# Patient Record
Sex: FEMALE | Race: BLACK OR AFRICAN AMERICAN | Employment: FULL TIME | URBAN - METROPOLITAN AREA
[De-identification: names, ages, dates, MRNs, and addresses within clinical notes are randomized per-mention and may not be internally consistent; named-entity substitution may affect disease eponyms.]

---

## 2023-03-31 ENCOUNTER — NURSE ONLY (OUTPATIENT)
Dept: CARDIOLOGY CLINIC | Age: 23
End: 2023-03-31

## 2023-03-31 ENCOUNTER — INITIAL CONSULT (OUTPATIENT)
Dept: CARDIOLOGY CLINIC | Age: 23
End: 2023-03-31

## 2023-03-31 VITALS
BODY MASS INDEX: 24.59 KG/M2 | WEIGHT: 153 LBS | HEIGHT: 66 IN | HEART RATE: 101 BPM | DIASTOLIC BLOOD PRESSURE: 80 MMHG | SYSTOLIC BLOOD PRESSURE: 100 MMHG

## 2023-03-31 DIAGNOSIS — R00.2 PALPITATIONS: Primary | ICD-10-CM

## 2023-03-31 DIAGNOSIS — R06.02 SOB (SHORTNESS OF BREATH): ICD-10-CM

## 2023-03-31 NOTE — PROGRESS NOTES
24 hour holter monitor [email or web address] for tachycardia Serial # [id number] . Instructed patient on monitor and proper use. Instructed on diary. When to remove and bring it back. Must leave the holter monitor on  without removing for the duration of time ordered. Answered all questions the patient had. Instructed patient to call [facility] at [phone] with any questions or concerns with the monitor.

## 2023-03-31 NOTE — PROGRESS NOTES
No results for input(s): WBC, HGB, HCT, PLT in the last 72 hours. No results for input(s): NA, K, CL, CO2, PHOS, BUN, CREATININE, CA in the last 72 hours. No results for input(s): AST, ALT, ALB, BILIDIR, BILITOT, ALKPHOS in the last 72 hours. No results for input(s): TROPONINI in the last 72 hours. No results found for: BNP  No results found for: INR, PROTIME      EKG:    Chest Xray:    ECHO:  Labs, echo, meds reviewed  Assessment: 20 y. o.year old with PMH of fast heart rate and allergies    Recommendations:    Palpitations: she has resting tachycardia: she has chronic tachycardia: her apple watch always showed hr 100 resting and with walking its 120 bpm, will get TSH, t4,echo , stress test and 24 hrs holter monitor, d dimer also ordered  Allergies: on allergra, she get weekly injections for last 1yr,she has allergies with dust, grass and most of the thinks  Health maintenance: exerise and diet  All labs, medications and tests reviewed, continue all other medications of all above medical condition listed as is.          Venice Galloway MD, 3/31/2023 10:32 AM

## 2023-04-07 ENCOUNTER — PROCEDURE VISIT (OUTPATIENT)
Dept: CARDIOLOGY CLINIC | Age: 23
End: 2023-04-07

## 2023-04-07 DIAGNOSIS — R94.31 ABNORMAL EKG: Primary | ICD-10-CM

## 2023-04-07 DIAGNOSIS — R06.02 SOB (SHORTNESS OF BREATH): ICD-10-CM

## 2023-04-07 DIAGNOSIS — R00.2 PALPITATIONS: Primary | ICD-10-CM

## 2023-04-07 DIAGNOSIS — R00.2 PALPITATIONS: ICD-10-CM

## 2023-04-07 NOTE — PROGRESS NOTES
External stress test done today. Had inappropriate tachycardia. Went from resting heart rate 90s to 130 upon standing.   start low-dose ivabradine 5 mg twice daily

## 2023-04-07 NOTE — PROGRESS NOTES
ECG report evaluated by Ivonne Sam CNP. Started on Metoprolol due to inappropriate tachycardia. Pt educated and released.